# Patient Record
Sex: MALE | Race: WHITE
[De-identification: names, ages, dates, MRNs, and addresses within clinical notes are randomized per-mention and may not be internally consistent; named-entity substitution may affect disease eponyms.]

---

## 2021-06-14 ENCOUNTER — HOSPITAL ENCOUNTER (OUTPATIENT)
Dept: HOSPITAL 97 - ER | Age: 61
Setting detail: OBSERVATION
LOS: 2 days | Discharge: HOME | End: 2021-06-16
Attending: HOSPITALIST
Payer: COMMERCIAL

## 2021-06-14 VITALS — BODY MASS INDEX: 36.6 KG/M2

## 2021-06-14 DIAGNOSIS — I10: ICD-10-CM

## 2021-06-14 DIAGNOSIS — E66.9: ICD-10-CM

## 2021-06-14 DIAGNOSIS — Z79.84: ICD-10-CM

## 2021-06-14 DIAGNOSIS — E11.9: ICD-10-CM

## 2021-06-14 DIAGNOSIS — F17.220: ICD-10-CM

## 2021-06-14 DIAGNOSIS — R07.9: Primary | ICD-10-CM

## 2021-06-14 DIAGNOSIS — Z20.822: ICD-10-CM

## 2021-06-14 LAB
ALBUMIN SERPL BCP-MCNC: 4.2 G/DL (ref 3.4–5)
ALP SERPL-CCNC: 91 U/L (ref 45–117)
ALT SERPL W P-5'-P-CCNC: 53 U/L (ref 12–78)
AST SERPL W P-5'-P-CCNC: 27 U/L (ref 15–37)
BUN BLD-MCNC: 19 MG/DL (ref 7–18)
CKMB CREATINE KINASE MB: 2.7 NG/ML (ref 1–3.6)
GLUCOSE SERPLBLD-MCNC: 98 MG/DL (ref 74–106)
HCT VFR BLD CALC: 44.5 % (ref 39.6–49)
INR BLD: 1.02
LIPASE SERPL-CCNC: 204 U/L (ref 73–393)
LYMPHOCYTES # SPEC AUTO: 1.8 K/UL (ref 0.7–4.9)
MAGNESIUM SERPL-MCNC: 2.3 MG/DL (ref 1.8–2.4)
NT-PROBNP SERPL-MCNC: 79 PG/ML (ref ?–125)
PMV BLD: 7.8 FL (ref 7.6–11.3)
POTASSIUM SERPL-SCNC: 3.9 MMOL/L (ref 3.5–5.1)
RBC # BLD: 5.03 M/UL (ref 4.33–5.43)
TROPONIN (EMERG DEPT USE ONLY): < 0.02 NG/ML (ref 0–0.04)
TROPONIN I: < 0.02 NG/ML (ref 0–0.04)
TSH SERPL DL<=0.05 MIU/L-ACNC: 2.01 UIU/ML (ref 0.36–3.74)

## 2021-06-14 PROCEDURE — 82553 CREATINE MB FRACTION: CPT

## 2021-06-14 PROCEDURE — 84443 ASSAY THYROID STIM HORMONE: CPT

## 2021-06-14 PROCEDURE — 94760 N-INVAS EAR/PLS OXIMETRY 1: CPT

## 2021-06-14 PROCEDURE — 80053 COMPREHEN METABOLIC PANEL: CPT

## 2021-06-14 PROCEDURE — 81003 URINALYSIS AUTO W/O SCOPE: CPT

## 2021-06-14 PROCEDURE — 96372 THER/PROPH/DIAG INJ SC/IM: CPT

## 2021-06-14 PROCEDURE — 83735 ASSAY OF MAGNESIUM: CPT

## 2021-06-14 PROCEDURE — 71045 X-RAY EXAM CHEST 1 VIEW: CPT

## 2021-06-14 PROCEDURE — 82550 ASSAY OF CK (CPK): CPT

## 2021-06-14 PROCEDURE — 83880 ASSAY OF NATRIURETIC PEPTIDE: CPT

## 2021-06-14 PROCEDURE — 80061 LIPID PANEL: CPT

## 2021-06-14 PROCEDURE — 85610 PROTHROMBIN TIME: CPT

## 2021-06-14 PROCEDURE — 84439 ASSAY OF FREE THYROXINE: CPT

## 2021-06-14 PROCEDURE — 36415 COLL VENOUS BLD VENIPUNCTURE: CPT

## 2021-06-14 PROCEDURE — 84484 ASSAY OF TROPONIN QUANT: CPT

## 2021-06-14 PROCEDURE — 93017 CV STRESS TEST TRACING ONLY: CPT

## 2021-06-14 PROCEDURE — 80076 HEPATIC FUNCTION PANEL: CPT

## 2021-06-14 PROCEDURE — 85025 COMPLETE CBC W/AUTO DIFF WBC: CPT

## 2021-06-14 PROCEDURE — 78452 HT MUSCLE IMAGE SPECT MULT: CPT

## 2021-06-14 PROCEDURE — 82947 ASSAY GLUCOSE BLOOD QUANT: CPT

## 2021-06-14 PROCEDURE — 93005 ELECTROCARDIOGRAM TRACING: CPT

## 2021-06-14 PROCEDURE — 93306 TTE W/DOPPLER COMPLETE: CPT

## 2021-06-14 PROCEDURE — 99284 EMERGENCY DEPT VISIT MOD MDM: CPT

## 2021-06-14 PROCEDURE — 96374 THER/PROPH/DIAG INJ IV PUSH: CPT

## 2021-06-14 PROCEDURE — 80048 BASIC METABOLIC PNL TOTAL CA: CPT

## 2021-06-14 PROCEDURE — 83690 ASSAY OF LIPASE: CPT

## 2021-06-14 RX ADMIN — ATORVASTATIN CALCIUM SCH: 10 TABLET, FILM COATED ORAL at 21:26

## 2021-06-14 RX ADMIN — HUMAN INSULIN SCH: 100 INJECTION, SOLUTION SUBCUTANEOUS at 18:04

## 2021-06-14 RX ADMIN — Medication SCH ML: at 21:00

## 2021-06-14 RX ADMIN — HUMAN INSULIN SCH: 100 INJECTION, SOLUTION SUBCUTANEOUS at 21:00

## 2021-06-14 NOTE — ER
Nurse's Notes                                                                                     

 [facility]                                                                 

Name: William Michael                                                                                 

Age: 61 yrs                                                                                       

Sex: Male                                                                                         

: 1960                                                                                   

MRN: [id number]                                                                                   

Arrival Date: 2021                                                                          

Time: 11:28                                                                                       

Account#: [id number]                                                                            

Bed 6                                                                                             

Private MD:                                                                                       

Diagnosis: Chest pain, unspecified;Dyspnea;Obesity, unspecified;Type 2 diabetes mellitus          

                                                                                                  

Presentation:                                                                                     

                                                                                             

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     

      construction site. Pt reported very high stress at job this morning, then the anxiety       

      increased and he started having difficulty breathing and chest tightness 8/10. VS WNL,      

      NSR on 12 lead, . Coronavirus screen: At this time, the client does not indicate     

      any symptoms associated with coronavirus-19. Ebola Screen: No symptoms or risks             

      identified at this time. Initial Sepsis Screen: Does the patient meet any 2 criteria?       

      No. Patient's initial sepsis screen is negative. Does the patient have a suspected          

      source of infection? No. Patient's initial sepsis screen is negative. Risk Assessment:      

      Do you want to hurt yourself or someone else? Patient reports no desire to harm self or     

      others. Onset of symptoms was 2021.                                                

11:28 Method Of Arrival: EMS: [facility]                                                        

11:28 Acuity: POONAM 3                                                                             

                                                                                                  

Triage Assessment:                                                                                

:31 General: Appears in no apparent distress. Behavior is calm, cooperative. Pain: Denies   hb  

      pain. EENT: No signs and/or symptoms were reported regarding the EENT system. Neuro:        

      Level of Consciousness is awake, alert, obeys commands, Oriented to person, place,          

      time, situation. Cardiovascular: Reports chest pain that resolved PTA Patient's skin is     

      warm and dry. Rhythm is regular. Respiratory: Reports SOB that resolved PTA Respiratory     

      effort is even, unlabored, Respiratory pattern is regular, symmetrical. GI: No signs        

      and/or symptoms were reported involving the gastrointestinal system. : No signs           

      and/or symptoms were reported regarding the genitourinary system. Derm: Skin is pink,       

      warm \T\ dry. Musculoskeletal: No signs and/or symptoms reported regarding the              

      musculoskeletal system.                                                                     

                                                                                                  

Historical:                                                                                       

- Allergies:                                                                                      

: No Known Allergies;                                                                     hb  

- Home Meds:                                                                                      

: metformin 500 mg Oral tab 1 tab 2 times per day [Active]; lisinopril 10 mg Oral tab 1   hb  

      tab once daily [Active];                                                                    

- PMHx:                                                                                           

11:31 Hypertension; Diabetes - NIDDM;                                                         hb  

- PSHx:                                                                                           

11:31 None;                                                                                   hb  

                                                                                                  

- Immunization history:: Adult Immunizations up to date.                                          

- Social history:: Smoking status: Patient reports use of chewing tobacco.                        

- Family history:: not pertinent.                                                                 

                                                                                                  

                                                                                                  

Screenin:33 Abuse screen: Denies threats or abuse. Denies injuries from another. Nutritional        hb  

      screening: No deficits noted. Tuberculosis screening: No symptoms or risk factors           

      identified. Fall Risk None identified.                                                      

                                                                                                  

Assessment:                                                                                       

11:32 General: see triage assessment .                                                        hb  

12:18 Reassessment: Patient appears in no apparent distress at this time. Patient and/or      hb  

      family updated on plan of care and expected duration. Pain level reassessed. Patient is     

      alert, oriented x 3, equal unlabored respirations, skin warm/dry/pink.                      

13:26 Reassessment: Patient appears in no apparent distress at this time. Patient and/or      hb  

      family updated on plan of care and expected duration. Pain level reassessed. Patient is     

      alert, oriented x 3, equal unlabored respirations, skin warm/dry/pink.                      

14:08 Reassessment: Patient appears in no apparent distress at this time. Patient and/or      hb  

      family updated on plan of care and expected duration. Pain level reassessed. Patient is     

      alert, oriented x 3, equal unlabored respirations, skin warm/dry/pink.                      

                                                                                                  

Vital Signs:                                                                                      

11:28  / 91; Pulse 88; Resp 15; Temp 97.8; Pulse Ox 100% on R/A; Weight 119.29 kg;      hb  

      Height 5 ft. 11 in. (180.34 cm); Pain 0/10;                                                 

12:25  / 78; Pulse 87; Resp 19; Pulse Ox 100% on R/A;                                   hb  

13:26  / 81; Pulse 84; Resp 18; Pulse Ox 99% on R/A;                                    hb  

14:07  / 73; Pulse 76; Resp 15; Pulse Ox 98% on R/A;                                    hb  

11:28 Body Mass Index 36.68 (119.29 kg, 180.34 cm)                                            hb  

                                                                                                  

ED Course:                                                                                        

11:28 Patient arrived in ED.                                                                  hb  

11:31 Triage completed.                                                                       hb  

11:31 Arm band placed on.                                                                     hb  

11:33 Patient has correct armband on for positive identification. Placed in gown. Bed in low  hb  

      position. Call light in reach. Cardiac monitor on. Pulse ox on. NIBP on.                    

11:40 Pete Nielsen MD is Attending Physician.                                             wai 

11:46 Mago Hernández, RN is Primary Nurse.                                                   hb  

12:12 Initial lab(s) drawn, by me, sent to lab. Inserted saline lock: 22 gauge in right hand, em1 

      using aseptic technique. Blood collected.                                                   

12:16 XRAY Chest (1 view) In Process Unspecified.                                             EDMS

13:49 Maciel Crawford MD is Hospitalizing Provider.                                           wai 

                                                                                                  

Administered Medications:                                                                         

12:17 Drug: NS 0.9% 1000 ml Route: IV; Rate: 1 bolus; Site: right antecubital;                hb  

12:17 Drug: NS 0.9% 1000 ml Route: IV; Rate: 1 bolus; Site: right hand;                       hb  

12:18 Drug: Aspirin 81 mg Route: PO;                                                          hb  

13:00 Follow up: Response: No adverse reaction                                                hb  

12:18 Drug: Pepcid (famotidine) 20 mg Route: IVP; Site: right hand;                           hb  

13:00 Follow up: Response: No adverse reaction                                                hb  

14:15 Drug: Lovenox (enoxaparin) 100 mg Route: Sub-Q; Site: abdomen;                          hb  

14:16 CANCELLED (change to 25mg PO per Dr. Nielsen): Lopressor (metoprolol TARTRATE) 50 mg   hb  

      PO once                                                                                     

14:16 Drug: Lopressor (metoprolol TARTRATE)) 25 mg Route: PO;                                 hb  

                                                                                                  

                                                                                                  

Outcome:                                                                                          

13:50 Decision to Hospitalize by Provider.                                                    wai 

18:04 Patient left the ED.                                                                    ph  

                                                                                                  

Signatures:                                                                                       

Dispatcher [employer]                           EDMS                                                 

Pete Nielsen MD MD cha Martinez Vinnie                               em1                                                  

Loretta Martel RN                      RN   ph                                                   

Mago Hernández, RN                     RN   hb                                                   

                                                                                                  

**************************************************************************************************

## 2021-06-14 NOTE — RAD REPORT
EXAM DESCRIPTION:  RAD - Chest Single View - 6/14/2021 12:16 pm

 

CLINICAL HISTORY:  Chest pain;Dyspnea

Chest pain.

 

COMPARISON:  No comparisons

 

FINDINGS:  Portable technique limits examination quality.

 

The lungs are mildly emphysematous but grossly clear. The heart is normal in size. No displaced fract
ures.

 

IMPRESSION:  No acute intrathoracic process suspected. Mild COPD

## 2021-06-14 NOTE — P.HP
Certification for Inpatient


Patient admitted to: Observation


With expected LOS: <2 Midnights


Practitioner: I am a practitioner with admitting privileges, knowledge of 

patient current condition, hospital course, and medical plan of care.


Services: Services provided to patient in accordance with Admission requirements

found in Title 42 Section 412.3 of the Code of Federal Regulations





Patient History


Date of Service: 06/14/21


Reason for admission: chest pain, ACS rule out


History of Present Illness: 





60yo M, PMH: NIDDM2, HTN


Presents to ED after 1 hour episode of chest tightness, feeling like someone was

sitting on his chest, and shortness of breath at work. Patient reports work was 

especially stressful today with lots of things going wrong. This seemed to build

up until he had this episode. he took 3 aspirin from his truck and eventually 

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 1 prior

episode years ago, similar stressful day and episode,but not as severe. He chews

tobacco for most of his life. He denies any recent illness, no nausea/vomiting, 

no abd pain, no diarrhea, no swelling, no rash/skin lesions.


In the ED, blood work rather unremarkable. CXR ok, EKG without acute ischemic 

changes, initial trop negative. ED provider recommended obs for ACS rule out.








- Past Medical/Surgical History


-: Hypertension


-: DM 2, non-insulin-dependent


Past Surgical History: Patient denies surgical history





- Family History


  ** Father


-: Heart disease (CHF)





  ** Mother


-: Heart disease (CHF)





- Social History


Smoking Status: Never smoker (Chews tobacco)


Alcohol use: No


Place of Residence: Home





Review of Systems


10-point ROS is otherwise unremarkable





Physical Examination





- Physical Exam


General: Alert, In no apparent distress, Oriented x3, Obese


HEENT: Sclerae nonicteric


Neck: Supple, 2+ carotid pulse no bruit, No LAD


Respiratory: Clear to auscultation bilaterally, Normal air movement


Cardiovascular: No edema, Regular rate/rhythm, Normal S1 S2, No murmurs


Gastrointestinal: Soft and benign, Non-distended, No tenderness


Musculoskeletal: No swelling, No erythema, No tenderness


Integumentary: No rashes, No significant lesion


Neurological: Normal speech, Normal strength at 5/5 x4 extr, Normal affect





- Studies


Laboratory Data (last 24 hrs)





06/14/21 12:10: PT 11.7, INR 1.02


06/14/21 12:10: WBC 7.80, Hgb 14.7, Hct 44.5, Plt Count 332


06/14/21 12:10: Sodium 143, Potassium 3.9, BUN 19 H, Creatinine 0.82, Glucose 

98, Magnesium 2.3, Total Bilirubin 0.3, AST 27, ALT 53, Alkaline Phosphatase 91,

Lipase 204








Assessment and Plan





- Advance Directives


Does patient have a Living Will: No


Does patient have a Durable POA for Healthcare: No


Physician Review Additional Text: 





Problem list


Chest pain/pressure, ACS rule out.


Hypertension


DM 2, non-insulin-dependent


obesity





-patient with a few risk factors for coronary disease.  possibly due to 

stress/anxiety, but otherwise denies anxiety


-diabetes has been worsening, A1c in the sevens, was previously ~6


-will bring patient in for observation, trend troponin, cardiology consulted


-insulin sliding scale, accucheks


-aspirin, lovenox, statin


-confirm home medications, restart as appropriate


-check thryoid studies





Code: full


dispo: anticipate dc home after negative workup


patient is from out of town, her for work





Time Spent Managing Pts Care (In Minutes): 60

## 2021-06-15 LAB
ALBUMIN SERPL BCP-MCNC: 3.2 G/DL (ref 3.4–5)
ALP SERPL-CCNC: 77 U/L (ref 45–117)
ALT SERPL W P-5'-P-CCNC: 42 U/L (ref 12–78)
AST SERPL W P-5'-P-CCNC: 20 U/L (ref 15–37)
BUN BLD-MCNC: 17 MG/DL (ref 7–18)
GLUCOSE SERPLBLD-MCNC: 109 MG/DL (ref 74–106)
HCT VFR BLD CALC: 36.9 % (ref 39.6–49)
HDLC SERPL-MCNC: 30 MG/DL (ref 40–60)
LDLC SERPL CALC-MCNC: 57 MG/DL (ref ?–130)
LYMPHOCYTES # SPEC AUTO: 3.1 K/UL (ref 0.7–4.9)
PMV BLD: 8.5 FL (ref 7.6–11.3)
POTASSIUM SERPL-SCNC: 4.2 MMOL/L (ref 3.5–5.1)
RBC # BLD: 4.17 M/UL (ref 4.33–5.43)
UA DIPSTICK W REFLEX MICRO PNL UR: (no result)

## 2021-06-15 RX ADMIN — HUMAN INSULIN SCH: 100 INJECTION, SOLUTION SUBCUTANEOUS at 21:00

## 2021-06-15 RX ADMIN — ENOXAPARIN SODIUM SCH MG: 40 INJECTION SUBCUTANEOUS at 09:53

## 2021-06-15 RX ADMIN — GLIMEPIRIDE SCH MG: 2 TABLET ORAL at 16:56

## 2021-06-15 RX ADMIN — HUMAN INSULIN SCH: 100 INJECTION, SOLUTION SUBCUTANEOUS at 16:30

## 2021-06-15 RX ADMIN — HUMAN INSULIN SCH: 100 INJECTION, SOLUTION SUBCUTANEOUS at 11:30

## 2021-06-15 RX ADMIN — ASPIRIN SCH MG: 81 TABLET, COATED ORAL at 09:53

## 2021-06-15 RX ADMIN — Medication SCH ML: at 21:00

## 2021-06-15 RX ADMIN — HUMAN INSULIN SCH: 100 INJECTION, SOLUTION SUBCUTANEOUS at 07:30

## 2021-06-15 RX ADMIN — ATORVASTATIN CALCIUM SCH: 10 TABLET, FILM COATED ORAL at 21:00

## 2021-06-15 RX ADMIN — Medication SCH ML: at 09:00

## 2021-06-15 NOTE — EKG
Test Date:    2021-06-14               Test Time:    11:38:44

Technician:   HB                                     

                                                     

MEASUREMENT RESULTS:                                       

Intervals:                                           

Rate:         87                                     

KY:           176                                    

QRSD:         102                                    

QT:           340                                    

QTc:          409                                    

Axis:                                                

P:            39                                     

KY:           176                                    

QRS:          11                                     

T:            46                                     

                                                     

INTERPRETIVE STATEMENTS:                                       

                                                     

Sinus rhythm with sinus arrhythmia with occasional premature ventricular

complexes

Otherwise normal ECG

No previous ECG available for comparison



Electronically Signed On 06-15-21 10:25:59 CDT by Dorian Cabello

## 2021-06-16 VITALS — DIASTOLIC BLOOD PRESSURE: 85 MMHG | SYSTOLIC BLOOD PRESSURE: 141 MMHG | TEMPERATURE: 97.1 F

## 2021-06-16 VITALS — OXYGEN SATURATION: 96 %

## 2021-06-16 RX ADMIN — GLIMEPIRIDE SCH MG: 2 TABLET ORAL at 15:48

## 2021-06-16 RX ADMIN — HUMAN INSULIN SCH: 100 INJECTION, SOLUTION SUBCUTANEOUS at 11:30

## 2021-06-16 RX ADMIN — ENOXAPARIN SODIUM SCH: 40 INJECTION SUBCUTANEOUS at 09:00

## 2021-06-16 RX ADMIN — ASPIRIN SCH MG: 81 TABLET, COATED ORAL at 09:03

## 2021-06-16 RX ADMIN — HUMAN INSULIN SCH: 100 INJECTION, SOLUTION SUBCUTANEOUS at 07:30

## 2021-06-16 RX ADMIN — Medication SCH ML: at 09:00

## 2021-06-16 NOTE — P.DS
Admission Date: 06/14/21


Discharge Date: 06/16/21


Disposition: ROUTINE DISCHARGE


Discharge Condition: FAIR


Reason for Admission: chest pain, ACS rule out


Consultations: 


Cardiology





- Problems


(1) Chest pain


Current Visit: Yes   Status: Acute   





(2) DM type 2 (diabetes mellitus, type 2)


Current Visit: Yes   Status: Acute   





(3) Hypertension


Current Visit: Yes   Status: Acute   


Brief History of Present Illness: 


61-year-old man with a history of diabetes and hypertension presented to the 

emergency department due to an episode of chest tightness, associated with 

shortness of breath.  Patient reports multiple episodes chest tightness which he

related to stressful days at work.  Aspirin with some improvement.  Patient 

brought to the emergency department where chest x-ray was unremarkable troponin 

negative, EKG without any ischemic changes.  Patient was hospitalized for ACS 

rule out.





Hospital Course: 


Patient placed under observation.  Troponin trended negative.  Cardiology was 

consulted who recommended echocardiogram and stress test.  Echocardiogram showed

normal EF, no wall motion abnormality and overall unremarkable.  Nuclear stress 

test was done which did not show any stress-induced ischemic.  ACS has been 

ruled out.  Patient discharged and placed on aspirin.  He will continue his 

diabetes medications, ezetimibe and his antihypertensives.





Vital Signs/Physical Exam: 














Temp Pulse Resp BP Pulse Ox


 


 97.4 F   64   20   154/79 H  96 


 


 06/16/21 07:00  06/16/21 07:00  06/16/21 07:00  06/16/21 07:00  06/16/21 07:00








General: Alert, In no apparent distress, Oriented x3


HEENT: Mucous membr. moist/pink


Neck: Supple, JVD not distended


Respiratory: Clear to auscultation bilaterally, Normal air movement


Cardiovascular: No edema, Regular rate/rhythm, Normal S1 S2


Gastrointestinal: Normal bowel sounds, Soft and benign, No tenderness


Musculoskeletal: No swelling, No tenderness


Integumentary: No rashes


Neurological: Normal strength at 5/5 x4 extr


Laboratory Data at Discharge: 














WBC  8.40 K/uL (4.3-10.9)   06/15/21  02:55    


 


Hgb  12.5 g/dL (13.6-17.9)  L  06/15/21  02:55    


 


Hct  36.9 % (39.6-49.0)  L D 06/15/21  02:55    


 


Plt Count  289 K/uL (152-406)   06/15/21  02:55    


 


PT  11.7 SECONDS (9.5-12.5)   06/14/21  12:10    


 


INR  1.02   06/14/21  12:10    


 


Sodium  142 mmol/L (136-145)   06/15/21  02:55    


 


Potassium  4.2 mmol/L (3.5-5.1)   06/15/21  02:55    


 


BUN  17 mg/dL (7-18)   06/15/21  02:55    


 


Creatinine  0.77 mg/dL (0.55-1.3)   06/15/21  02:55    


 


Glucose  109 mg/dL ()  H  06/15/21  02:55    


 


Magnesium  2.3 mg/dL (1.8-2.4)   06/14/21  12:10    


 


Total Bilirubin  0.1 mg/dL (0.2-1.0)  L  06/15/21  02:55    


 


AST  20 U/L (15-37)   06/15/21  02:55    


 


ALT  42 U/L (12-78)   06/15/21  02:55    


 


Alkaline Phosphatase  77 U/L ()   06/15/21  02:55    


 


Troponin I  < 0.02 ng/mL (0.0-0.045)   06/15/21  09:37    


 


Triglycerides  219 mg/dL (<150)  H  06/15/21  02:55    


 


Cholesterol  131 mg/dL (<200)   06/15/21  02:55    


 


HDL Cholesterol  30 mg/dL (40-60)  L  06/15/21  02:55    


 


Cholesterol/HDL Ratio  4.37   06/15/21  02:55    


 


Lipase  204 U/L ()   06/14/21  12:10    








Home Medications: 








Ezetimibe 10 mg PO DAILY 06/14/21 


Glimepiride 1 mg PO DAILY 06/14/21 


Glucosamine/D3/Boswellia Maddy [Osteo Bi-Flex Tablet] 1 tab PO DAILY 06/14/21 


Lisinopril/Hydrochlorothiazide [Lisinopril-Hctz 20-25 mg Tab] 1 tab PO DAILY 

06/14/21 


Magnesium Oxide [Magnesium] 500 mg PO DAILY 06/14/21 


Metformin HCl 1,000 mg PO DAILY 06/14/21 


Multivit-Min/FA/Lycopen/Lutein [Centrum Silver Tablet] 1 tab PO DAILY 06/14/21 


Aspirin [Aspirin EC] 81 mg PO DAILY #30 tablet. 06/16/21 





New Medications: 


Aspirin [Aspirin EC] 81 mg PO DAILY #30 tablet.


Diet: ADA


Activity: Ad beverly


Followup: 


NONE,NONE [Primary Care Provider] - 1-2 Weeks

## 2021-06-16 NOTE — ECHO
HEIGHT: 5 ft 11 in   WEIGHT: 263 lb 0 oz   DATE OF STUDY: 06/15/2021   REFER DR: 
Pete Nielsen MD

2-DIMENSIONAL: YES

     M.MODE: YES

 DOPPLER: YES

COLOR FLOW: YES



                    TDS:  NO

PORTABLE: NO

 DEFINITY:  NO

BUBBLE STUDY: NO





DIAGNOSIS:  CHEST PAIN



CARDIAC HISTORY:  

CATHERIZATION: NO

SURGERY: NO

PROSTHETIC VALVE: NO

PACEMAKER: NO





MEASUREMENTS (cm)

    DIASTOLIC (NORMALS)                 SYSTOLIC (NORMALS)

IVSd                 1.1 (0.6-1.2)                    LA Diam 3.0 (1.9-4.0)     LVEF       
  55-60%  

LVIDd               5.2 (3.5-5.7)                        LVIDs      4.8 (2.0-3.5)     %FS  
         %

LVPWd             5.2 (0.6-1.2)

Ao Diam           0.9 (2.0-3.7)



2 DIMENSIONAL ASSESSMENT:

RIGHT ATRIUM:                   NORAML

LEFT ATRIUM:       NORMAL



RIGHT VENTRICLE:            NORMAL

LEFT VENTRICLE: NORMAL



TRICUSPID VALVE:             NORMAL

MITRAL VALVE:     NOMAL



PULMONIC VALVE:             NORMAL

AORTIC VALVE:     NORMAL



PERICARDIAL EFFUSION: NONE

AORTIC ROOT:      NORMAL





LEFT VENTRICULAR WALL MOTION:     NORMAL



DOPPLER/COLOR FLOW:     NORMAL



COMMENTS:      NORMAL 2D ECHOCARDIOGRAM WITH DOPPLER. NO WALL MOTION ABNORMALITY. 

NO EFFUSION.



TECHNOLOGIST:   SANIA LEPE

## 2021-06-16 NOTE — RAD REPORT
EXAM DESCRIPTION:  NM - Rest Stress Cardiac Imaging - 6/16/2021 1:55 pm

 

CLINICAL HISTORY:  Chest Pain

Chest pain.

 

COMPARISON:  No comparisons

 

TECHNIQUE:  The patient was administered approximately 10mCi of Tc 99m Sestamibi prior to resting SPE
CT imaging of the heart. The patient was then administered approximately 30 mCi of Tc 99m Sestamibi f
ollowing exercise or pharmacologic stress. Multiplanar SPECT images were reviewed.

 

FINDINGS:  No stress induced ischemic defect is seen to suggest stress induced ischemia. No fixed def
ect is seen to suggest hibernating myocardium or scarred myocardium.

 

The end diastolic volume is 176 ml, the end systolic volume is 106 ml, and the ejection fraction is 4
0 %.

 

 

IMPRESSION:  No stress induced ischemia.

## 2021-06-17 NOTE — TREADPHA
DX:  CHEST PAIN



Date of Study: 06/16/2021





Ht: 5' 11 "

Wt:  263 lb 0 oz



Consulting Physician:  NIKI







MEDICATIONS:  HYPERTENSION, NON-INSULIN DEPENDENT DIABETES MELLITUS, TOBACCO USE, HIGH 
CHOLESTEROL



HISTORY:  61 YEAR OLD WOMAN WITH ATYPICAL CHEST PAIN.



PHYSICIAL EXAMINATION: 

            



RESTING B.P.:  138/82

RESTING H.R.:  59





RESTING EKG:  NORMAL RHYTHM, NON-SPECIFIC ST CHANGES

                    

            

PROTOCOL:  PHARMACOLOGIC

EXERCISE TIME:  3:30 

B.P. AT PEAK STRESS:  147/78





IMPRESSION:  LEXISCAN STRESS TEST PERFORMED.  CARDIOLITE INJECTED PER PROTOCOL.  

SEE NUCLEAR MEDICINE REPORT.  NO SUPRAVENTRICULAR TACHYCARDIA.  NO ARRHYTHMIAS NOTED.  
RESPIRATORY EVEN NONLABORED.  PATIENT TOLERATED WELL.

## 2021-06-19 NOTE — CON
Date of Consultation:  06/15/2021



Reason For Consultation:  Chest pain.



History Of Present Illness:  Mr. Michael is a 61-year-old male with history of diabetes and hypertensi
on.  He takes metformin and lisinopril.  Came in with chest pain in the substernal region that does n
ot radiate.  It is not exertional.  No nausea, vomiting, diaphoresis, PND, orthopnea, pedal edema, pa
lpitations, or syncope.  Had 1 single episode that lasted few hours, but yet his EKG, chest x-ray, tr
oponin and BNP are normal.



Past Medical History:  As stated above.



Allergies:  NONE.



Medications:  As stated above.



Review of Systems:

Negative.



Social History:  Negative.



Family History:  Negative.



Physical Examination:

Vital Signs:  Stable, afebrile. 

HEENT:  Negative. 

Neck:  Supple without any bruit, lymphadenopathy, JVD, or thyromegaly. 

Chest:  Clear to auscultation and percussion. 

Cardiac:  Revealed a regular rhythm and rate without any murmurs, gallops, or rubs. 

Abdomen:  Benign. 

Extremities:  Revealed no clubbing, cyanosis, or edema.



Diagnostic Data:  EKG showed sinus rhythm with PVCs.  Chest x-ray was negative.  Troponin was negativ
e.  Echocardiogram was normal.  Nuclear stress test was normal with a normal ejection fraction.



Impression And Plan:  Atypical chest pain, most likely gastroesophageal reflux disease in nature.  Th
e patient has already had a normal echo and a normal stress test, and I am comfortable with him going
 home whenever it is okay with Dr. Roldan.





NB/KATHY

DD:  06/19/2021 12:34:35Voice ID:  [id number]

DT:  06/19/2021 13:33:50Report ID:  [id number]